# Patient Record
(demographics unavailable — no encounter records)

---

## 2024-10-14 NOTE — HISTORY OF PRESENT ILLNESS
[Coronary Artery Disease] : coronary artery disease [No Pertinent Pulmonary History] : no history of asthma, COPD, sleep apnea, or smoking [No Adverse Anesthesia Reaction] : no adverse anesthesia reaction in self or family member [(Patient denies any chest pain, claudication, dyspnea on exertion, orthopnea, palpitations or syncope)] : Patient denies any chest pain, claudication, dyspnea on exertion, orthopnea, palpitations or syncope [Good (7-10 METs)] : Good (7-10 METs) [Chronic Anticoagulation] : no chronic anticoagulation [Chronic Kidney Disease] : no chronic kidney disease [Diabetes] : no diabetes [FreeTextEntry1] : prostate biopsy  [FreeTextEntry2] : 11/5/24 [FreeTextEntry3] : Ajay  [FreeTextEntry4] : 80 yo m presents for prostate biopsy clearance  increased psa  denies fevers, chills, cp, sob  active lifestyle-- no cp or sob with activity  has had sx before-- done well with anesthesia

## 2024-12-16 NOTE — PHYSICAL EXAM
[Normal Appearance] : normal appearance [Well Groomed] : well groomed [General Appearance - In No Acute Distress] : no acute distress [Edema] : no peripheral edema [Respiration, Rhythm And Depth] : normal respiratory rhythm and effort [Exaggerated Use Of Accessory Muscles For Inspiration] : no accessory muscle use [Abdomen Soft] : soft [Abdomen Tenderness] : non-tender [Costovertebral Angle Tenderness] : no ~M costovertebral angle tenderness [Urinary Bladder Findings] : the bladder was normal on palpation [Normal Station and Gait] : the gait and station were normal for the patient's age [] : no rash [No Focal Deficits] : no focal deficits [Oriented To Time, Place, And Person] : oriented to person, place, and time [Affect] : the affect was normal [Mood] : the mood was normal [de-identified] : BUDDY deferred by patient

## 2024-12-16 NOTE — ASSESSMENT
[FreeTextEntry1] : 79 year old man with BPH S/P urolift in 2019, now with elevated PSA to 4.5 in 4/2024, MRI with 23 cc gland, 8 mm PIRADS 4 lesion in the right PZpl apex. Prostate biopsy 11/5/24 with GG 3 cancer in MRI target and ASAP in 1/12 systematic biopsies. Cribriform pattern 4 present in target. PSMA PET/CT negative for metastatic disease.   We spent this consultation discussing the management of prostate cancer. We discussed the role and rationale for active surveillance, focal therapy, surgical therapy, and radiation therapy. Given the unfavorable intermediate risk nature of this disease, I discouraged active surveillance. I explained that radiation therapy and surgery are for curative intent and the literature suggests that the outcomes for these two approaches are comparable in terms of oncologic outcomes. Can also consider focal therapy given localized disease only in MRI target. He is very concerned about potential negative effects from treatment, so I encouraged him to meet with Dr. Rojas to discuss focal therapy.  - F/U with Dr. Rojas

## 2024-12-16 NOTE — PHYSICAL EXAM
[Normal Appearance] : normal appearance [Well Groomed] : well groomed [General Appearance - In No Acute Distress] : no acute distress [Edema] : no peripheral edema [Respiration, Rhythm And Depth] : normal respiratory rhythm and effort [Exaggerated Use Of Accessory Muscles For Inspiration] : no accessory muscle use [Abdomen Soft] : soft [Abdomen Tenderness] : non-tender [Costovertebral Angle Tenderness] : no ~M costovertebral angle tenderness [Urinary Bladder Findings] : the bladder was normal on palpation [Normal Station and Gait] : the gait and station were normal for the patient's age [] : no rash [No Focal Deficits] : no focal deficits [Oriented To Time, Place, And Person] : oriented to person, place, and time [Affect] : the affect was normal [Mood] : the mood was normal [de-identified] : BUDDY deferred by patient

## 2024-12-16 NOTE — LETTER BODY
[Dear  ___] : Dear  [unfilled], [Courtesy Letter:] : I had the pleasure of seeing your patient, [unfilled], in my office today. [Please see my note below.] : Please see my note below. [Consult Closing:] : Thank you very much for allowing me to participate in the care of this patient.  If you have any questions, please do not hesitate to contact me. [Sincerely,] : Sincerely, [FreeTextEntry3] : Uday Moss MD

## 2024-12-16 NOTE — HISTORY OF PRESENT ILLNESS
[FreeTextEntry1] : 79 year old man with GG 3 intermediate risk prostate cancer from biopsy 11/5/24.  PSA 4.23--8/2024; 4.51--4/2024; 1.43--12/2018  Prostate MRI 8/27/24: 23 cc gland, 8 mm PIRADS 4 lesion in the right PZpl apex  Prostate biopsy 11/5/24: GG 3 cancer in right PZpl apex target, ASAP in left anterior apex systematic biopsy  PSMA PET/CT 12/4/24: Mo evidence of metastatic disease  History of BPH/LUTS, primarily frequency and urgency. Had Urolift in 2019. No significant bothersome urinary symptoms currently. No BPH medications. Denies fevers, chills, dysuria, hematuria.  Dupixent Pregnancy And Lactation Text: This medication likely crosses the placenta but the risk for the fetus is uncertain. This medication is excreted in breast milk.

## 2024-12-16 NOTE — HISTORY OF PRESENT ILLNESS
[FreeTextEntry1] : 79 year old man with GG 3 intermediate risk prostate cancer from biopsy 11/5/24.  PSA 4.23--8/2024; 4.51--4/2024; 1.43--12/2018  Prostate MRI 8/27/24: 23 cc gland, 8 mm PIRADS 4 lesion in the right PZpl apex  Prostate biopsy 11/5/24: GG 3 cancer in right PZpl apex target, ASAP in left anterior apex systematic biopsy  PSMA PET/CT 12/4/24: Mo evidence of metastatic disease  History of BPH/LUTS, primarily frequency and urgency. Had Urolift in 2019. No significant bothersome urinary symptoms currently. No BPH medications. Denies fevers, chills, dysuria, hematuria.

## 2025-03-11 NOTE — HISTORY OF PRESENT ILLNESS
[FreeTextEntry1] : Dear Dr. Moss (Urologist),  Thank you so much for the referral to help care for your patient.  Chief Complaint: Prostate Cancer Screening   Date of first visit: 03/19/2025  ISIDRO ESCOBEDO is a 80 year old  man who presents for prostate cancer screening.  PSA Hx 4.23 08/19/2024 4.51 04/15/2024  1.43 12/04/2018   MRI Hx MRI at [facility] on 09/10/2024. 22.8 cc prostate with PIRADS 4 lesion in the right posterior lateral PZ at apex measuring  6.2 x 8.1 x 8.3 mm . No LAD No EPE, No Bony Lesions.  The images have been reviewed and clinical implications discussed with the patient.  Biopsy Hx Prostate biopsy 11/5/24: GG 3 cancer in right PZpl apex target, ASAP in left anterior apex systematic biopsy  03/19/2025 IPSS  QOL  ANTHONY  EPIC 26   The patient denies fevers, chills, nausea and or vomiting and no unexplained weight loss.  All pertinent laboratory, films and physician notes were reviewed.  Questionnaire results were discussed with patient.

## 2025-03-19 NOTE — PHYSICAL EXAM
[Normal] : well developed, well nourished, in no acute distress [General Appearance - Well Developed] : well developed [General Appearance - In No Acute Distress] : in no acute distress [FreeTextEntry1] : ISIDRO ESCOBEDO is a 80-year-old man who presents for GG3 prostate cancer. patient had elevated PSA 4.23 8/24, MRI revealed PIRADS 4 right PZPL, biopsy done by Dr Moss revealed GG3 R.PZPL and ASAP ECTOR  IRB Notification  The patient has been made aware of the opportunity to participate in our A Study to Evaluate the Preliminary Effectiveness of Focal US guided Cryoablation Using DynaCAD / UroNAV preplanning / guidance of Locally Confined Intermediate Risk Prostate Cancer.  This is an IRB approved trial (IRB #).  He is already being consented for a standard ultrasound guided prostate cryoablation therefore there is no change in his clinical work flow.  He has been given a copy of the consent to review before the ablation date and given an opportunity to contact us with any further questions.   Prostate cancer - GG3 R PZPL, ASAP ECTOR - MRI in september, images reviewed, good candidate for focal - Needs new MRI, will order 3/19/25 - Labs today - Schedule Spaceor and cryotherapy - Stop Plavix 7 days before Spaceor and 7 days before cryo - Cardiology clearance before stopping plavix  SPACEOAR Day Consent  Thank you very much for allowing me to assist in the care of this patient. Please do not hesitate to contact me with any additional questions or concerns.     Sincerely,     Valentino Rojas D.O. Professor of Urology and Radiology  of Urology at [facility] Director for Prostate Cancer [address] [place] [address], 5th Floor [facility], [facility] Phone: [phone]   I saw and examined/evaluated the patient with the resident ((Nicole Tidwell MD (PGY 6)) discussed w/ resident and agree with findings.

## 2025-03-19 NOTE — HISTORY OF PRESENT ILLNESS
[FreeTextEntry1] : Dear Dr. Moss (Urologist),  Thank you so much for the referral to help care for your patient.  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 patient had elevated PSA 4.23 8/24, MRI revealed PIRADS 4 right PZPL, biopsy done by Dr Moss revealed GG3 R.PZPL and ASAP ECTOR  PSA Hx 4.23 08/19/2024 4.51 04/15/2024  1.43 12/04/2018   MRI Hx MRI at [facility] on 09/10/2024. 22.8 cc prostate with PIRADS 4 lesion in the right posterior lateral PZ at apex measuring 6.2 x 8.1 x 8.3 mm . No LAD No EPE, No Bony Lesions.  The images have been reviewed and clinical implications discussed with the patient.  Biopsy Hx Prostate biopsy 11/5/24: GG 3 cancer in right PZpl apex target, ASAP in left anterior apex systematic biopsy  03/19/2025 IPSS 18  QOL 2  ANTHONY 17 EPIC 26   The patient denies fevers, chills, nausea and or vomiting and no unexplained weight loss.  All pertinent laboratory, films and physician notes were reviewed.  Questionnaire results were discussed with patient.
[FreeTextEntry1] : Dear Dr. Moss (Urologist),  Thank you so much for the referral to help care for your patient.  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 patient had elevated PSA 4.23 8/24, MRI revealed PIRADS 4 right PZPL, biopsy done by Dr Moss revealed GG3 R.PZPL and ASAP ECTOR  PSA Hx 4.23 08/19/2024 4.51 04/15/2024  1.43 12/04/2018   MRI Hx MRI at [facility] on 09/10/2024. 22.8 cc prostate with PIRADS 4 lesion in the right posterior lateral PZ at apex measuring 6.2 x 8.1 x 8.3 mm . No LAD No EPE, No Bony Lesions.  The images have been reviewed and clinical implications discussed with the patient.  Biopsy Hx Prostate biopsy 11/5/24: GG 3 cancer in right PZpl apex target, ASAP in left anterior apex systematic biopsy  03/19/2025 IPSS 18  QOL 2  ANTHONY 17 EPIC 26   The patient denies fevers, chills, nausea and or vomiting and no unexplained weight loss.  All pertinent laboratory, films and physician notes were reviewed.  Questionnaire results were discussed with patient.
Explanation of exam/test

## 2025-03-19 NOTE — PHYSICAL EXAM
[Normal] : well developed, well nourished, in no acute distress [General Appearance - Well Developed] : well developed [General Appearance - In No Acute Distress] : in no acute distress [FreeTextEntry1] : ISIDRO ESCOBEDO is a 80-year-old man who presents for GG3 prostate cancer. patient had elevated PSA 4.23 8/24, MRI revealed PIRADS 4 right PZPL, biopsy done by Dr Moss revealed GG3 R.PZPL and ASAP ECTOR  IRB Notification  The patient has been made aware of the opportunity to participate in our A Study to Evaluate the Preliminary Effectiveness of Focal US guided Cryoablation Using DynaCAD / UroNAV preplanning / guidance of Locally Confined Intermediate Risk Prostate Cancer.  This is an IRB approved trial (IRB #).  He is already being consented for a standard ultrasound guided prostate cryoablation therefore there is no change in his clinical work flow.  He has been given a copy of the consent to review before the ablation date and given an opportunity to contact us with any further questions.   Prostate cancer - GG3 R PZPL, ASAP ECTOR - MRI in september, images reviewed, good candidate for focal - Needs new MRI, will order 3/19/25 - Labs today - Schedule Spaceor and cryotherapy - Stop Plavix 7 days before Spaceor and 7 days before cryo - Cardiology clearance before stopping plavix  SPACEOAR Day Consent  Thank you very much for allowing me to assist in the care of this patient. Please do not hesitate to contact me with any additional questions or concerns.     Sincerely,     Valentino Rojas D.O. Professor of Urology and Radiology  of Urology at [facility] [place] Director for Prostate Cancer [address] [place] [address], 5th Floor [facility], [facility] Phone: [phone]   I saw and examined/evaluated the patient with the resident ((Nicole Tidwell MD (PGY 6)) discussed w/ resident and agree with findings.

## 2025-04-09 NOTE — REASON FOR VISIT
[FreeTextEntry1] : ====================================== Diagnostic Tests: ----------------------------------------- EC2025: Sinus rhythm ----------------------------------------- ECHO: 2021: Nml right and left ventricular size and function. AS, mild AR. ------------------------------------------ Stress: 20: stress echocardiogram: EF 55%, normal wall motion, + ECG, 7.6 METS. normal exercise stress echo. 17: exercise MPI: EF 81%, normal perfusion.  16: exercise MPI: EF 73%, normal perfusion.  09/30/15: exercise MPI: EF 60%, normal perfusion.  ----------------------------------------- CTCA:  2025:  Patent stents in proximal LAD and proximal LCx.  Stent noted in RPDA, probably patent. Small size limits evaluation. Distal LMCA / ostial LAD are not well visualized due to attenuation artifact from stent in left circumflex. Cannot exclude significant stenosis in these segments. Calcific plaque obscures the lumen in mid LAD, distal LAD, mid RCA and ramus intermedius, precluding stenosis evaluation. Remaining segments demonstrate non obstructive coronary artery disease.  2021: Stent present in pLAD appears patent. Small areas of hypoattenuation are suggestive of mild ISR. Stents present in pLCx and RPDA, probably patent. Remaining segments are non-obstructive. Mild calcification of the aorta and aortic valve.  10/11/2017: LM mild, LAD prox patent stent, jailed D1, LCx prox patent stent, RI mild, RA mild, RPDA patent stent. -------------------------------------------- Cath: 16: LM normal, LAD prox patent stent, mid 60%, LCx prox patent stent, RCA mild, RPDA 75% (FFR 0.79, underwent PCI).  16: LM mid 40%, LAD prox patent stent, mid 50%, LCx prox patent stent, RCA mild, RPDA prox 40%, EF 60%.  16: LM mid 30%, LAD prox patent stent, LCX prox 90% (underwent PCI), RCA mild, EF 60%.

## 2025-04-09 NOTE — HISTORY OF PRESENT ILLNESS
[FreeTextEntry1] : Mr. Dallas presents for follow up and management of HTN, dyslipidemia, CAD s/p multivessel PCI; LAD (4/15), LCx (5/16) and RPDA (12/16). COLUMBA, CVA (left caudate), and recent dx of prostate ca here for follow up. He continues to be physically active. He feels well overall and denies chest pain, SOB, palpitations, CONY and dizziness. He is able to walk >10 [place] blocks without any symptoms of SOB and DIAZ. Pt had a recent CCTA which revealed distal LMCA / ostial LAD are not well visualized due to attenuation artifact from stent in left circumflex. Cannot exclude significant stenosis in these segments. Calcific plaque obscures the lumen in mid LAD, distal LAD, mid RCA and ramus intermedius, precluding stenosis evaluation. Pt reports he is scheduled for a ultrasound guided prostate cryoablation.

## 2025-04-09 NOTE — ASSESSMENT
[FreeTextEntry1] : 80-year-old man who presents for GG3 prostate cancer. patient had elevated PSA 4.23 8/24, MRI revealed PIRADS 4 right PZPL, biopsy done by Dr Moss revealed GG3 R.PZPL and ASAP ECTOR  Prostate cancer - GG3 R PZPL, ASAP ECTOR - MRI in september, images reviewed, good candidate for focal - updated MRI 3/31/25 PIRADS 4 in right posterior lateral PZ at apex, PRECISE 3 - Scheduled for Spaceoar 4/16/25 and cryotherapy 5/8/25 - Stop Plavix 5 days before Spaceoar and 5 days before cryo - Cardiology clearance before stopping plavix  SpaceOAR prep discussed.  NO dietary restrictions on day of procedure, instructed to eat and drink normally that day. Fleet enema 3-4 hours prior to procedure. Stop/hold blood thinners 7 days before with supervision/approval from prescribing physician. Discussed potential side effects such as hematuria and hematospermia post procedure. Can take tylenol as needed for pain, but if severe pain, fevers, chills, unable to urinate--> go to ER  Patient verbalized understanding of all information given.  SPACEOAR Day Consent  Thank you very much for allowing me to assist in the care of this patient. Please do not hesitate to contact me with any additional questions or concerns.

## 2025-04-09 NOTE — HISTORY OF PRESENT ILLNESS
[Home] : at home, [unfilled] , at the time of the visit. [Medical Office: ([facility])___] : at the medical office located in  [Telephone (audio)] : This telephonic visit was provided via audio only technology. [Verbal consent obtained from patient] : the patient, [unfilled] [FreeTextEntry1] : Dear Dr. Moss (Urologist),  Thank you so much for the referral to help care for your patient.  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 patient had elevated PSA 4.23 8/24, MRI revealed PIRADS 4 right PZPL, biopsy done by Dr Moss revealed GG3 R.PZPL and ASAP ECTOR  PSA Hx 4.22 3/20/25  4.23 08/19/2024 4.51 04/15/2024 1.43 12/04/2018  MRI Hx MRI at [facility] on 3/31/25  22.5 cc prostate, PIRADS 4 lesion in right posterior lateral PZ at apex, measuring 8.7 x 4.3 x 5.1 mm, PRECISE 3, No EPE. No LAD and no bony lesions. The images have been reviewed and clinical implications discussed with the patient.  MRI at [facility] on 09/10/2024. 22.8 cc prostate with PIRADS 4 lesion in the right posterior lateral PZ at apex measuring 6.2 x 8.1 x 8.3 mm . No LAD No EPE, No Bony Lesions. The images have been reviewed and clinical implications discussed with the patient.  Biopsy Hx Prostate biopsy 11/5/24: GG 3 cancer in right PZpl apex target, ASAP in left anterior apex systematic biopsy  03/19/2025 IPSS 18 QOL 2 ANTHONY 17 EPIC 26  The patient denies fevers, chills, nausea and or vomiting and no unexplained weight loss.  All pertinent laboratory, films and physician notes were reviewed. Questionnaire results were discussed with patient.

## 2025-04-09 NOTE — ASSESSMENT
[FreeTextEntry1] : ============================================== 1. CAD: 3V CAD, PCI prox LAD (04/15), PCI prox LCx (05/16), PCI RPDA (12/16). Asymptomatic - Continue aggressive medical management - Continue clopidogrel long-term given extent of CAD - EKG to evaluate any rhythm abnormalities  2. HTN: BP at ACC/AHA 2017 guideline target - Continue Toprol XL 25mg po QD  3. Dyslipidemia: LDL 79, HDL 70, TG 78 (4/2024). mild plaque on carotid Usg. - Continue atorvastatin 80mg daily  - Discussed therapeutic lifestyle changes to promote improved lipid metabolism - Discussed elevated Lpa in relation to ASCVD and importance of first-degree relative screening.   4. Preoperative for prostate cryoablation. - Patient is scheduled for a low-risk surgery and carries  intermediate preoperative cardiovascular risk     - RCRI class risk III and has >4 METS functional capacity and has no acute cardiac complaints.    - He underwent a CCTA on 04/04/2025: Calcific plaque obscures the lumen in mid LAD, distal LAD, mid RCA and ramus intermedius, precluding stenosis evaluation. - Will send for a stress echo to evaluate extent of CAD.

## 2025-04-10 NOTE — PHYSICAL EXAM
[FreeTextEntry1] : 80-year-old man who presents for GG3 prostate cancer. patient had elevated PSA 4.23 8/24, MRI revealed PIRADS 4 right PZPL, biopsy done by Dr Moss revealed GG3 R.PZPL and ASAP ECTOR  IRB Notification  The patient has been made aware of the opportunity to participate in our A Study to Evaluate the Preliminary Effectiveness of Focal US guided Cryoablation Using DynaCAD / UroNAV preplanning / guidance of Locally Confined Intermediate Risk Prostate Cancer. This is an IRB approved trial (IRB #). He is already being consented for a standard ultrasound guided prostate cryoablation therefore there is no change in his clinical work flow. He has been given a copy of the consent to review before the ablation date and given an opportunity to contact us with any further questions.  Prostate cancer - GG3 R PZPL, ASAP ECTOR - MRI in september, images reviewed, good candidate for focal - Needs new MRI, will order 3/19/25 - Labs today - Schedule Spaceor and cryotherapy - Stop Plavix 7 days before Spaceor and 7 days before cryo - Cardiology clearance before stopping plavix  SPACEOAR Day Consent  Thank you very much for allowing me to assist in the care of this patient. Please do not hesitate to contact me with any additional questions or concerns.   Sincerely,

## 2025-04-10 NOTE — HISTORY OF PRESENT ILLNESS
[FreeTextEntry1] : Dear Dr. Moss (Urologist),  Thank you so much for the referral to help care for your patient.  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 patient had elevated PSA 4.23 8/24, MRI revealed PIRADS 4 right PZPL, biopsy done by Dr Moss revealed GG3 R.PZPL and ASAP ECTOR  PSA Hx 4.22 03/19/2025 4.23 08/19/2024 4.51 04/15/2024 1.43 12/04/2018  MRI Hx MRI at [facility] on 3/31/25 22.5 cc prostate, PIRADS 4 lesion in right posterior lateral PZ at apex, measuring 8.7 x 4.3 x 5.1 mm, PRECISE 3, No EPE. No LAD and no bony lesions. The images have been reviewed and clinical implications discussed with the patient.  MRI at [facility] on 09/10/2024. 22.8 cc prostate with PIRADS 4 lesion in the right posterior lateral PZ at apex measuring 6.2 x 8.1 x 8.3 mm . No LAD No EPE, No Bony Lesions. The images have been reviewed and clinical implications discussed with the patient.  Biopsy Hx Prostate biopsy 11/5/24: GG 3 cancer in right PZpl apex target, ASAP in left anterior apex systematic biopsy  04/16/2025 IPSS  QOL  ANTHONY  EPIC 26  03/19/2025 IPSS 18 QOL 2 ANTHONY 17 EPIC 26  The patient denies fevers, chills, nausea and or vomiting and no unexplained weight loss.  All pertinent laboratory, films and physician notes were reviewed. Questionnaire results were discussed with patient.

## 2025-04-16 NOTE — PHYSICAL EXAM
[FreeTextEntry1] : 80-year-old man who presents for GG3 prostate cancer. patient had elevated PSA 4.23 8/24, MRI revealed PIRADS 4 right PZPL, biopsy done by Dr Moss revealed GG3 R.PZPL and ASAP ECTOR.  We completed surveys and consent for the procedure.  IRB Notification  The patient has been made aware of the opportunity to participate in our A Study to Evaluate the Preliminary Effectiveness of Focal US guided Cryoablation Using DynaCAD / UroNAV preplanning / guidance of Locally Confined Intermediate Risk Prostate Cancer. This is an IRB approved trial (IRB #). He is already being consented for a standard ultrasound guided prostate cryoablation therefore there is no change in his clinical work flow. He has been given a copy of the consent to review before the ablation date and given an opportunity to contact us with any further questions.  IRB Notification  The patient has been made aware of the opportunity to participate in our A Study to Evaluate the Preliminary Effectiveness of Focal US guided Cryoablation Using DynaCAD / UroNAV preplanning / guidance of Locally Confined Intermediate Risk Prostate Cancer.  This is an IRB approved trial (IRB #).  He is already being consented for a standard ultrasound guided prostate cryoablation therefore there is no change in his clinical work flow.  He has been given a copy of the consent to review before the ablation date and given an opportunity to contact us with any further questions.  He will be consented on the day of the ablation procedure.  The above subject has voluntarily agreed to participate in a research study:  A Study to Evaluate the Safety and Efficacy of Focal US guided Cryo-ablation Using DynaCAD / UroNAV preplanning / guidance of Locally Confined Low to Intermediate Risk Prostate Cancer Informed consent was obtained prior to any study procedures being performed.   The following was discussed during the consent process: 	The fact that the study involves research 	The study schedule and procedures involved 	The main risks of the study, and the fact that all risks may not be known at this time 	New information that may affect the subjects willingness to continue on the study will be presented as soon as it is available 	Benefits of participating 	Alternatives to participating 	Confidentiality  	Compensation for research-related injury 	Contacts for questions about the study or their rights while on the study 	The fact that the subjects participation is voluntary - they can refuse or withdraw  at any time without penalty or loss of benefits.  The subject was given ample time to ask questions prior to signing - all questions were answered to the subjects satisfaction.  The subject, investigator, and witness signed the informed consent document(s).  A copy of the signed consent form was given to the subject via email. The original was in  and the other filed in the Regulatory Binder.   Prostate cancer - GG3 R PZPL, ASAP ECTOR - MRI in september, images reviewed, good candidate for focal - Needs new MRI, will order 3/19/25 - Labs today - Schedule Spaceor and cryotherapy - Stop Plavix 7 days before Spaceor and 7 days before cryo - Cardiology clearance before stopping plavix  Thank you very much for allowing me to assist in the care of this patient. Please do not hesitate to contact me with any additional questions or concerns.     Sincerely,     Valentino Rojas D.O. Professor of Urology and Radiology  of Urology at [facility] Director for Prostate Cancer [address] [place] [address], [address] [place] [address] Phone: [phone]

## 2025-04-16 NOTE — HISTORY OF PRESENT ILLNESS
[FreeTextEntry1] : Dear Dr. Moss (Urologist),  Thank you so much for the referral to help care for your patient.  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 patient had elevated PSA 4.23 8/24, MRI revealed PIRADS 4 right PZPL, biopsy done by Dr Moss revealed GG3 R.PZPL and ASAP ECTOR.  S/p SPACE OAR today 4/16/25.  He is also here to discuss research consent.  PSA Hx 4.22 03/19/2025 4.23 08/19/2024 4.51 04/15/2024 1.43 12/04/2018  MRI Hx MRI at [facility] on 3/31/25 22.5 cc prostate, PIRADS 4 lesion in right posterior lateral PZ at apex, measuring 8.7 x 4.3 x 5.1 mm, PRECISE 3, No EPE. No LAD and no bony lesions. The images have been reviewed and clinical implications discussed with the patient.  MRI at [facility] on 09/10/2024. 22.8 cc prostate with PIRADS 4 lesion in the right posterior lateral PZ at apex measuring 6.2 x 8.1 x 8.3 mm . No LAD No EPE, No Bony Lesions. The images have been reviewed and clinical implications discussed with the patient.  Biopsy Hx Prostate biopsy 11/5/24: GG 3 cancer in right PZpl apex target, ASAP in left anterior apex systematic biopsy  04/16/2025 IPSS  QOL  ANTHONY  EPIC 26  03/19/2025 IPSS 18 QOL 2 ANTHONY 17 EPIC 26  The patient denies fevers, chills, nausea and or vomiting and no unexplained weight loss.  All pertinent laboratory, films and physician notes were reviewed. Questionnaire results were discussed with patient.

## 2025-05-20 NOTE — PHYSICAL EXAM
[FreeTextEntry1] : 80-year-old man who presents for GG3 prostate cancer. patient had elevated PSA 4.23 8/24, MRI revealed PIRADS 4 right PZPL, biopsy done by Dr Moss revealed GG3 R.PZPL and ASAP ECTOR. We completed surveys and consent for the procedure.  IRB Notification  The patient has been made aware of the opportunity to participate in our A Study to Evaluate the Preliminary Effectiveness of Focal US guided Cryoablation Using DynaCAD / UroNAV preplanning / guidance of Locally Confined Intermediate Risk Prostate Cancer. This is an IRB approved trial (IRB #). He is already being consented for a standard ultrasound guided prostate cryoablation therefore there is no change in his clinical work flow. He has been given a copy of the consent to review before the ablation date and given an opportunity to contact us with any further questions.  IRB Notification  The patient has been made aware of the opportunity to participate in our A Study to Evaluate the Preliminary Effectiveness of Focal US guided Cryoablation Using DynaCAD / UroNAV preplanning / guidance of Locally Confined Intermediate Risk Prostate Cancer. This is an IRB approved trial (IRB #). He is already being consented for a standard ultrasound guided prostate cryoablation therefore there is no change in his clinical work flow. He has been given a copy of the consent to review before the ablation date and given an opportunity to contact us with any further questions. He will be consented on the day of the ablation procedure.  The above subject has voluntarily agreed to participate in a research study: A Study to Evaluate the Safety and Efficacy of Focal US guided Cryo-ablation Using DynaCAD / UroNAV preplanning / guidance of Locally Confined Low to Intermediate Risk Prostate Cancer Informed consent was obtained prior to any study procedures being performed. The following was discussed during the consent process:  The fact that the study involves research  The study schedule and procedures involved  The main risks of the study, and the fact that all risks may not be known at this time  New information that may affect the subjects willingness to continue on the study will be presented as soon as it is available  Benefits of participating  Alternatives to participating  Confidentiality  Compensation for research-related injury  Contacts for questions about the study or their rights while on the study  The fact that the subjects participation is voluntary - they can refuse or withdraw at any time without penalty or loss of benefits.  The subject was given ample time to ask questions prior to signing - all questions were answered to the subjects satisfaction.  The subject, investigator, and witness signed the informed consent document(s). A copy of the signed consent form was given to the subject via email. The original was in  and the other filed in the Regulatory Binder.  Prostate cancer - GG3 R PZPL, ASAP ECTOR - MRI in september, images reviewed, good candidate for focal - Needs new MRI, will order 3/19/25 - Labs today - Schedule Spaceor and cryotherapy - Stop Plavix 7 days before Spaceor and 7 days before cryo - Cardiology clearance before stopping plavix  Thank you very much for allowing me to assist in the care of this patient. Please do not hesitate to contact me with any additional questions or concerns.   Sincerely,

## 2025-05-20 NOTE — HISTORY OF PRESENT ILLNESS
[FreeTextEntry1] : Dear Dr. Moss (Urologist),  Thank you so much for the referral to help care for your patient.  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 patient had elevated PSA 4.23 8/24, MRI revealed PIRADS 4 right PZPL, biopsy done by Dr Moss revealed GG3 R.PZPL and ASAP ECTOR. S/p SPACE OAR today 4/16/25. He is also here to discuss research consent.  PSA Hx 4.22 03/19/2025 4.23 08/19/2024 4.51 04/15/2024 1.43 12/04/2018  MRI Hx MRI at [facility] on 3/31/25 22.5 cc prostate, PIRADS 4 lesion in right posterior lateral PZ at apex, measuring 8.7 x 4.3 x 5.1 mm, PRECISE 3, No EPE. No LAD and no bony lesions. The images have been reviewed and clinical implications discussed with the patient.  MRI at [facility] on 09/10/2024. 22.8 cc prostate with PIRADS 4 lesion in the right posterior lateral PZ at apex measuring 6.2 x 8.1 x 8.3 mm . No LAD No EPE, No Bony Lesions. The images have been reviewed and clinical implications discussed with the patient.  Biopsy Hx Prostate biopsy 11/5/24: GG 3 cancer in right PZpl apex target, ASAP in left anterior apex systematic biopsy  06/04/2025 IPSS  QOL  ANTHONY  EPIC 26  04/16/2025 IPSS QOL ANTHONY EPIC 26  03/19/2025 IPSS 18 QOL 2 ANTHONY 17 EPIC 26  The patient denies fevers, chills, nausea and or vomiting and no unexplained weight loss.  All pertinent laboratory, films and physician notes were reviewed. Questionnaire results were discussed with patient.

## 2025-06-04 NOTE — PHYSICAL EXAM
[FreeTextEntry1] : 80-year-old man who presents for GG3 prostate cancer. patient had elevated PSA 4.23 8/24, MRI revealed PIRADS 4 right PZPL, biopsy done by Dr Moss revealed GG3 R.PZPL and ASAP ECTOR.  Patient underwent cyroablation 5/8/2025. Patient doing well reports no issues or side effects after the procedure.  Yet he still has issues with urination.  The Urolift clips should come out and we can open up the bladder neck.  Prostate cancer - GG3 R PZPL, ASAP ECTOR - Cyroablation 5/8/2025 with spaceor- no complications - PSA 6/4/2025  BPH - IPSS 23 - Flomax, patient ever tried it, side effects discussed, dizziness, orthostatic hypotension, headache, congestion, retrograde ejaculation. will stop and go to ER if severe  Because of the patient's medical management/prior surgical implants, we discussed the different surgical options: REZUM, Urolift,  KTP (Greenlight) laser, Standard TURP, PAE, and Button TURP. We discussed the risks, including bleeding, infection, damage to the urethra, bladder, and ureteral orifices, scar tissue, and leaking (either due to injuring the sphincter or lowering bladder outlet obstruction with concomitant DO). The patient is not planning on having additional children and understands that retrograde ejaculation is also a risk of this procedure. This can be permanent. All TURP procedures can cause short or long-term dysuria and intermittent hematuria.   The patient understands that some of these complications are reversible while some are not and may require additional procedures. He understands that the prostate grows throughout life, and this procedure does not guarantee that he will not need a future procedure.  - Has urolift clips in the bladder on MRI review, will need TUR removal - labs today - Cardiac clearance - needs to stop plavix 7 days  Thank you very much for allowing me to assist in the care of this patient. Please do not hesitate to contact me with any additional questions or concerns.     Sincerely,     Valentino Rojas D.O. Professor of Urology and Radiology  of Urology at [facility] Director for Prostate Cancer [address], [address] [facility], [facility] Phone: [phone]   I saw and examined/evaluated the patient with the resident ((Nicole Tidwell MD (PGY 6)) discussed w/ resident and agree with findings.

## 2025-06-04 NOTE — HISTORY OF PRESENT ILLNESS
[FreeTextEntry1] : Dear Dr. Moss (Urologist),  Thank you so much for the referral to help care for your patient.  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 patient had elevated PSA 4.23 8/24, MRI revealed PIRADS 4 right PZPL, biopsy done by Dr Moss revealed GG3 R.PZPL and ASAP ECTOR. S/p SPACE OAR today 4/16/25. Patient underwent cyroablation 5/8/2025. Patient doing well reports no issues or side effects after the procedure. Patient is having urinary symptoms, MRI review shows urolift clip in the bladder ( 6 years ago)  PSA Hx 4.22 03/19/2025 4.23 08/19/2024 4.51 04/15/2024 1.43 12/04/2018  MRI Hx MRI at [facility] on 3/31/25 22.5 cc prostate, PIRADS 4 lesion in right posterior lateral PZ at apex, measuring 8.7 x 4.3 x 5.1 mm, PRECISE 3, No EPE. No LAD and no bony lesions. The images have been reviewed and clinical implications discussed with the patient.  MRI at [facility] on 09/10/2024. 22.8 cc prostate with PIRADS 4 lesion in the right posterior lateral PZ at apex measuring 6.2 x 8.1 x 8.3 mm . No LAD No EPE, No Bony Lesions. The images have been reviewed and clinical implications discussed with the patient.  Biopsy Hx Prostate biopsy 11/5/24: GG 3 cancer in right PZpl apex target, ASAP in left anterior apex systematic biopsy  06/04/2025 IPSS 23  QOL 5 ANTHONY 18 EPIC 26  04/16/2025 IPSS QOL ANTHONY EPIC 26  03/19/2025 IPSS 18 QOL 2 ANTHONY 17 EPIC 26  The patient denies fevers, chills, nausea and or vomiting and no unexplained weight loss.  All pertinent laboratory, films and physician notes were reviewed. Questionnaire results were discussed with patient.

## 2025-06-04 NOTE — HISTORY OF PRESENT ILLNESS
[FreeTextEntry1] : Dear Dr. Moss (Urologist),  Thank you so much for the referral to help care for your patient.  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 patient had elevated PSA 4.23 8/24, MRI revealed PIRADS 4 right PZPL, biopsy done by Dr Moss revealed GG3 R.PZPL and ASAP ECTOR. S/p SPACE OAR today 4/16/25. Patient underwent cyroablation 5/8/2025. Patient doing well reports no issues or side effects after the procedure. Patient is having urinary symptoms, MRI review shows urolift clip in the bladder ( 6 years ago)  PSA Hx 4.22 03/19/2025 4.23 08/19/2024 4.51 04/15/2024 1.43 12/04/2018  MRI Hx MRI at [facility] on 3/31/25 22.5 cc prostate, PIRADS 4 lesion in right posterior lateral PZ at apex, measuring 8.7 x 4.3 x 5.1 mm, PRECISE 3, No EPE. No LAD and no bony lesions. The images have been reviewed and clinical implications discussed with the patient.  MRI at [facility] on 09/10/2024. 22.8 cc prostate with PIRADS 4 lesion in the right posterior lateral PZ at apex measuring 6.2 x 8.1 x 8.3 mm . No LAD No EPE, No Bony Lesions. The images have been reviewed and clinical implications discussed with the patient.  Biopsy Hx Prostate biopsy 11/5/24: GG 3 cancer in right PZpl apex target, ASAP in left anterior apex systematic biopsy  06/04/2025 IPSS 23  QOL 5 ANTHONY 18 EPIC 26  04/16/2025 IPSS QOL ANTHONY EPIC 26  03/19/2025 IPSS 18 QOL 2 ANTHONY 17 EPIC 26  The patient denies fevers, chills, nausea and or vomiting and no unexplained weight loss.  All pertinent laboratory, films and physician notes were reviewed. Questionnaire results were discussed with patient.

## 2025-06-04 NOTE — PHYSICAL EXAM
[FreeTextEntry1] : 80-year-old man who presents for GG3 prostate cancer. patient had elevated PSA 4.23 8/24, MRI revealed PIRADS 4 right PZPL, biopsy done by Dr Moss revealed GG3 R.PZPL and ASAP ECTOR.  Patient underwent cyroablation 5/8/2025. Patient doing well reports no issues or side effects after the procedure.  Yet he still has issues with urination.  The Urolift clips should come out and we can open up the bladder neck.  Prostate cancer - GG3 R PZPL, ASAP ECTOR - Cyroablation 5/8/2025 with spaceor- no complications - PSA 6/4/2025  BPH - IPSS 23 - Flomax, patient ever tried it, side effects discussed, dizziness, orthostatic hypotension, headache, congestion, retrograde ejaculation. will stop and go to ER if severe  Because of the patient's medical management/prior surgical implants, we discussed the different surgical options: REZUM, Urolift,  KTP (Greenlight) laser, Standard TURP, PAE, and Button TURP. We discussed the risks, including bleeding, infection, damage to the urethra, bladder, and ureteral orifices, scar tissue, and leaking (either due to injuring the sphincter or lowering bladder outlet obstruction with concomitant DO). The patient is not planning on having additional children and understands that retrograde ejaculation is also a risk of this procedure. This can be permanent. All TURP procedures can cause short or long-term dysuria and intermittent hematuria.   The patient understands that some of these complications are reversible while some are not and may require additional procedures. He understands that the prostate grows throughout life, and this procedure does not guarantee that he will not need a future procedure.  - Has urolift clips in the bladder on MRI review, will need TUR removal - labs today - Cardiac clearance - needs to stop plavix 7 days  Thank you very much for allowing me to assist in the care of this patient. Please do not hesitate to contact me with any additional questions or concerns.     Sincerely,     Valentino Rojas D.O. Professor of Urology and Radiology  of Urology at [facility] Director for Prostate Cancer [address], [address] [facility], [facility] [place] Phone: [phone]   I saw and examined/evaluated the patient with the resident ((Nicole Tidwell MD (PGY 6)) discussed w/ resident and agree with findings.

## 2025-07-09 NOTE — HISTORY OF PRESENT ILLNESS
[FreeTextEntry1] : Mr. Maximo Dallas presents for follow up and management of HTN, dyslipidemia, CAD s/p multivessel PCI; LAD (4/15), LCx (5/16) and RPDA (12/16). COLUMBA, CVA (left caudate), recent dx of prostate ca and underwent cyroablation on 5/8/2025 who presents for cardiac clearance. He feels well overall and denies chest pain, SOB, palpitations, CONY and dizziness.  He continues to be physically active. He is able to walk >10 [place] blocks without any symptoms of SOB and DIAZ. Pt continues to have urination issues and reports he is scheduled for TUR removal this month.

## 2025-07-09 NOTE — PHYSICAL EXAM
[General Appearance - Well Developed] : well developed [Normal Appearance] : normal appearance [Well Groomed] : well groomed [General Appearance - Well Nourished] : well nourished [No Deformities] : no deformities [General Appearance - In No Acute Distress] : no acute distress [Normal Conjunctiva] : the conjunctiva exhibited no abnormalities [Eyelids - No Xanthelasma] : the eyelids demonstrated no xanthelasmas [Normal Oral Mucosa] : normal oral mucosa [No Oral Pallor] : no oral pallor [No Oral Cyanosis] : no oral cyanosis [Normal Jugular Venous A Waves Present] : normal jugular venous A waves present [Normal Jugular Venous V Waves Present] : normal jugular venous V waves present [No Jugular Venous Sanderson A Waves] : no jugular venous sanedrson A waves [Respiration, Rhythm And Depth] : normal respiratory rhythm and effort [Exaggerated Use Of Accessory Muscles For Inspiration] : no accessory muscle use [Auscultation Breath Sounds / Voice Sounds] : lungs were clear to auscultation bilaterally [Bowel Sounds] : normal bowel sounds [Abdomen Soft] : soft [Abdomen Tenderness] : non-tender [Abdomen Mass (___ Cm)] : no abdominal mass palpated [Abnormal Walk] : normal gait [Gait - Sufficient For Exercise Testing] : the gait was sufficient for exercise testing [Nail Clubbing] : no clubbing of the fingernails [Cyanosis, Localized] : no localized cyanosis [Petechial Hemorrhages (___cm)] : no petechial hemorrhages [Skin Color & Pigmentation] : normal skin color and pigmentation [Skin Turgor] : normal skin turgor [] : no rash [No Venous Stasis] : no venous stasis [Skin Lesions] : no skin lesions [No Skin Ulcers] : no skin ulcer [No Xanthoma] : no  xanthoma was observed [Oriented To Time, Place, And Person] : oriented to person, place, and time [Impaired Insight] : insight and judgment were intact [Affect] : the affect was normal [Mood] : the mood was normal [No Anxiety] : not feeling anxious [Normal Rate] : normal [Rhythm Regular] : regular [Normal S1] : normal S1 [Normal S2] : normal S2 [No Murmur] : no murmurs heard [2+] : left 2+ [No Abnormalities] : the abdominal aorta was not enlarged and no bruit was heard [No Pitting Edema] : no pitting edema present [Lt] : varicose veins of the left leg noted [S3] : no S3 [S4] : no S4 [Right Carotid Bruit] : no bruit heard over the right carotid [Left Carotid Bruit] : no bruit heard over the left carotid [Right Femoral Bruit] : no bruit heard over the right femoral artery [Left Femoral Bruit] : no bruit heard over the left femoral artery

## 2025-07-09 NOTE — PHYSICAL EXAM
[General Appearance - Well Developed] : well developed [Normal Appearance] : normal appearance [Well Groomed] : well groomed [No Deformities] : no deformities [General Appearance - Well Nourished] : well nourished [General Appearance - In No Acute Distress] : no acute distress [Normal Conjunctiva] : the conjunctiva exhibited no abnormalities [Eyelids - No Xanthelasma] : the eyelids demonstrated no xanthelasmas [Normal Oral Mucosa] : normal oral mucosa [No Oral Pallor] : no oral pallor [No Oral Cyanosis] : no oral cyanosis [Normal Jugular Venous A Waves Present] : normal jugular venous A waves present [Normal Jugular Venous V Waves Present] : normal jugular venous V waves present [No Jugular Venous Sanderson A Waves] : no jugular venous sanderson A waves [Respiration, Rhythm And Depth] : normal respiratory rhythm and effort [Exaggerated Use Of Accessory Muscles For Inspiration] : no accessory muscle use [Auscultation Breath Sounds / Voice Sounds] : lungs were clear to auscultation bilaterally [Bowel Sounds] : normal bowel sounds [Abdomen Soft] : soft [Abdomen Tenderness] : non-tender [Abdomen Mass (___ Cm)] : no abdominal mass palpated [Abnormal Walk] : normal gait [Gait - Sufficient For Exercise Testing] : the gait was sufficient for exercise testing [Nail Clubbing] : no clubbing of the fingernails [Petechial Hemorrhages (___cm)] : no petechial hemorrhages [Cyanosis, Localized] : no localized cyanosis [Skin Color & Pigmentation] : normal skin color and pigmentation [Skin Turgor] : normal skin turgor [] : no rash [No Venous Stasis] : no venous stasis [Skin Lesions] : no skin lesions [No Skin Ulcers] : no skin ulcer [No Xanthoma] : no  xanthoma was observed [Oriented To Time, Place, And Person] : oriented to person, place, and time [Impaired Insight] : insight and judgment were intact [Affect] : the affect was normal [Mood] : the mood was normal [No Anxiety] : not feeling anxious [Normal Rate] : normal [Rhythm Regular] : regular [Normal S1] : normal S1 [Normal S2] : normal S2 [No Murmur] : no murmurs heard [2+] : left 2+ [No Abnormalities] : the abdominal aorta was not enlarged and no bruit was heard [No Pitting Edema] : no pitting edema present [Lt] : varicose veins of the left leg noted [S3] : no S3 [S4] : no S4 [Right Carotid Bruit] : no bruit heard over the right carotid [Left Carotid Bruit] : no bruit heard over the left carotid [Right Femoral Bruit] : no bruit heard over the right femoral artery [Left Femoral Bruit] : no bruit heard over the left femoral artery

## 2025-07-09 NOTE — ASSESSMENT
[FreeTextEntry1] : ============================================== 1. CAD: 3V CAD, PCI prox LAD (04/15), PCI prox LCx (05/16), PCI RPDA (12/16). Asymptomatic - Continue aggressive medical management - Continue clopidogrel long-term given extent of CAD - EKG to evaluate any rhythm abnormalities  2. HTN: BP at ACC/AHA 2017 guideline target - Continue Toprol XL 25mg po QD  3. Dyslipidemia: LDL 79, HDL 70, TG 78 (4/2024). mild plaque on carotid Usg. - Continue atorvastatin 80mg daily  - Discussed therapeutic lifestyle changes to promote improved lipid metabolism - Will obtain labs next visit  4. Preoperative for TUR prostate urolift clips removal on July 24th, 2025, with Dr. Rojas. - Patient is scheduled for a low-risk surgery and carries intermediate preoperative cardiovascular risk     - RCRI class risk I and has >4 METS functional capacity and has no acute cardiac complaints.    - On 4/11/2025 he underwent a stress echo which revealed no echocardiographic evidence of exercise induced ischemia. - He may proceed for his bladder procedure. - Stop Plavix 7 days prior to the procedures and resume when surgically cleared. - Follow up in the office as scheduled.

## 2025-07-09 NOTE — REASON FOR VISIT
[Hyperlipidemia] : hyperlipidemia [Hypertension] : hypertension [Coronary Artery Disease] : coronary artery disease [Other: ____] : [unfilled] [FreeTextEntry1] : ====================================== Diagnostic Tests: ----------------------------------------- [id number]: Sinus rhythm 2025: Sinus rhythm ----------------------------------------- ECHO: 2025: JEREMIAS: No echocardiographic evidence of exercise induced ischemia. METs 7.1 2021: Nml right and left ventricular size and function. AS, mild AR. ------------------------------------------ Stress: 20: stress echocardiogram: EF 55%, normal wall motion, + ECG, 7.6 METS. normal exercise stress echo. 17: exercise MPI: EF 81%, normal perfusion.  16: exercise MPI: EF 73%, normal perfusion.  09/30/15: exercise MPI: EF 60%, normal perfusion.  ----------------------------------------- CTCA:  2025:  Patent stents in proximal LAD and proximal LCx.  Stent noted in RPDA, probably patent. Small size limits evaluation. Distal LMCA / ostial LAD are not well visualized due to attenuation artifact from stent in left circumflex. Cannot exclude significant stenosis in these segments. Calcific plaque obscures the lumen in mid LAD, distal LAD, mid RCA and ramus intermedius, precluding stenosis evaluation. Remaining segments demonstrate non obstructive coronary artery disease.  2021: Stent present in pLAD appears patent. Small areas of hypoattenuation are suggestive of mild ISR. Stents present in pLCx and RPDA, probably patent. Remaining segments are non-obstructive. Mild calcification of the aorta and aortic valve.  10/11/2017: LM mild, LAD prox patent stent, jailed D1, LCx prox patent stent, RI mild, RA mild, RPDA patent stent. -------------------------------------------- Cath: 16: LM normal, LAD prox patent stent, mid 60%, LCx prox patent stent, RCA mild, RPDA 75% (FFR 0.79, underwent PCI).  16: LM mid 40%, LAD prox patent stent, mid 50%, LCx prox patent stent, RCA mild, RPDA prox 40%, EF 60%.  16: LM mid 30%, LAD prox patent stent, LCX prox 90% (underwent PCI), RCA mild, EF 60%.